# Patient Record
Sex: MALE | Race: WHITE | NOT HISPANIC OR LATINO | URBAN - METROPOLITAN AREA
[De-identification: names, ages, dates, MRNs, and addresses within clinical notes are randomized per-mention and may not be internally consistent; named-entity substitution may affect disease eponyms.]

---

## 2022-09-29 ENCOUNTER — EMERGENCY (EMERGENCY)
Facility: HOSPITAL | Age: 57
LOS: 0 days | Discharge: HOME | End: 2022-09-29
Attending: STUDENT IN AN ORGANIZED HEALTH CARE EDUCATION/TRAINING PROGRAM

## 2022-09-29 VITALS
TEMPERATURE: 98 F | WEIGHT: 279.99 LBS | DIASTOLIC BLOOD PRESSURE: 86 MMHG | OXYGEN SATURATION: 99 % | RESPIRATION RATE: 20 BRPM | SYSTOLIC BLOOD PRESSURE: 145 MMHG | HEART RATE: 71 BPM

## 2022-09-29 VITALS — SYSTOLIC BLOOD PRESSURE: 163 MMHG | HEART RATE: 85 BPM | DIASTOLIC BLOOD PRESSURE: 80 MMHG

## 2022-09-29 DIAGNOSIS — M54.6 PAIN IN THORACIC SPINE: ICD-10-CM

## 2022-09-29 DIAGNOSIS — S01.81XA LACERATION WITHOUT FOREIGN BODY OF OTHER PART OF HEAD, INITIAL ENCOUNTER: ICD-10-CM

## 2022-09-29 DIAGNOSIS — S52.592A OTHER FRACTURES OF LOWER END OF LEFT RADIUS, INITIAL ENCOUNTER FOR CLOSED FRACTURE: ICD-10-CM

## 2022-09-29 DIAGNOSIS — Z88.0 ALLERGY STATUS TO PENICILLIN: ICD-10-CM
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
## 2022-09-29 DIAGNOSIS — S22.020A WEDGE COMPRESSION FRACTURE OF SECOND THORACIC VERTEBRA, INITIAL ENCOUNTER FOR CLOSED FRACTURE: ICD-10-CM
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
## 2022-09-29 DIAGNOSIS — S22.010A WEDGE COMPRESSION FRACTURE OF FIRST THORACIC VERTEBRA, INITIAL ENCOUNTER FOR CLOSED FRACTURE: ICD-10-CM

## 2022-09-29 DIAGNOSIS — M25.532 PAIN IN LEFT WRIST: ICD-10-CM

## 2022-09-29 DIAGNOSIS — Z20.822 CONTACT WITH AND (SUSPECTED) EXPOSURE TO COVID-19: ICD-10-CM

## 2022-09-29 LAB
ALBUMIN SERPL ELPH-MCNC: 3.7 G/DL — SIGNIFICANT CHANGE UP (ref 3.3–5)
ALP SERPL-CCNC: 60 U/L — SIGNIFICANT CHANGE UP (ref 40–120)
ALT FLD-CCNC: 29 U/L — SIGNIFICANT CHANGE UP (ref 12–78)
ANION GAP SERPL CALC-SCNC: 6 MMOL/L — SIGNIFICANT CHANGE UP (ref 5–17)
APTT BLD: 27.9 SEC — SIGNIFICANT CHANGE UP (ref 27.5–35.5)
AST SERPL-CCNC: 31 U/L — SIGNIFICANT CHANGE UP (ref 15–37)
BASOPHILS # BLD AUTO: 0.03 K/UL — SIGNIFICANT CHANGE UP (ref 0–0.2)
BASOPHILS NFR BLD AUTO: 0.5 % — SIGNIFICANT CHANGE UP (ref 0–2)
BILIRUB SERPL-MCNC: 0.8 MG/DL — SIGNIFICANT CHANGE UP (ref 0.2–1.2)
BLD GP AB SCN SERPL QL: SIGNIFICANT CHANGE UP
BUN SERPL-MCNC: 16 MG/DL — SIGNIFICANT CHANGE UP (ref 7–23)
CALCIUM SERPL-MCNC: 9.3 MG/DL — SIGNIFICANT CHANGE UP (ref 8.5–10.1)
CHLORIDE SERPL-SCNC: 113 MMOL/L — HIGH (ref 96–108)
CO2 SERPL-SCNC: 24 MMOL/L — SIGNIFICANT CHANGE UP (ref 22–31)
CREAT SERPL-MCNC: 1.12 MG/DL — SIGNIFICANT CHANGE UP (ref 0.5–1.3)
EGFR: 77 ML/MIN/1.73M2 — SIGNIFICANT CHANGE UP
EOSINOPHIL # BLD AUTO: 0.12 K/UL — SIGNIFICANT CHANGE UP (ref 0–0.5)
EOSINOPHIL NFR BLD AUTO: 1.9 % — SIGNIFICANT CHANGE UP (ref 0–6)
FLUAV AG NPH QL: SIGNIFICANT CHANGE UP
FLUBV AG NPH QL: SIGNIFICANT CHANGE UP
GLUCOSE BLDC GLUCOMTR-MCNC: 128 MG/DL — HIGH (ref 70–99)
GLUCOSE SERPL-MCNC: 152 MG/DL — HIGH (ref 70–99)
HCT VFR BLD CALC: 45.2 % — SIGNIFICANT CHANGE UP (ref 39–50)
HGB BLD-MCNC: 14.6 G/DL — SIGNIFICANT CHANGE UP (ref 13–17)
IMM GRANULOCYTES NFR BLD AUTO: 0.8 % — SIGNIFICANT CHANGE UP (ref 0–0.9)
INR BLD: 1.04 RATIO — SIGNIFICANT CHANGE UP (ref 0.88–1.16)
LIDOCAIN IGE QN: 196 U/L — SIGNIFICANT CHANGE UP (ref 73–393)
LYMPHOCYTES # BLD AUTO: 1.09 K/UL — SIGNIFICANT CHANGE UP (ref 1–3.3)
LYMPHOCYTES # BLD AUTO: 17 % — SIGNIFICANT CHANGE UP (ref 13–44)
MCHC RBC-ENTMCNC: 26.7 PG — LOW (ref 27–34)
MCHC RBC-ENTMCNC: 32.3 G/DL — SIGNIFICANT CHANGE UP (ref 32–36)
MCV RBC AUTO: 82.8 FL — SIGNIFICANT CHANGE UP (ref 80–100)
MONOCYTES # BLD AUTO: 0.41 K/UL — SIGNIFICANT CHANGE UP (ref 0–0.9)
MONOCYTES NFR BLD AUTO: 6.4 % — SIGNIFICANT CHANGE UP (ref 2–14)
NEUTROPHILS # BLD AUTO: 4.7 K/UL — SIGNIFICANT CHANGE UP (ref 1.8–7.4)
NEUTROPHILS NFR BLD AUTO: 73.4 % — SIGNIFICANT CHANGE UP (ref 43–77)
NRBC # BLD: 0 /100 WBCS — SIGNIFICANT CHANGE UP (ref 0–0)
PLATELET # BLD AUTO: 244 K/UL — SIGNIFICANT CHANGE UP (ref 150–400)
POTASSIUM SERPL-MCNC: 4.4 MMOL/L — SIGNIFICANT CHANGE UP (ref 3.5–5.3)
POTASSIUM SERPL-SCNC: 4.4 MMOL/L — SIGNIFICANT CHANGE UP (ref 3.5–5.3)
PROT SERPL-MCNC: 6.8 GM/DL — SIGNIFICANT CHANGE UP (ref 6–8.3)
PROTHROM AB SERPL-ACNC: 12.5 SEC — SIGNIFICANT CHANGE UP (ref 10.5–13.4)
RBC # BLD: 5.46 M/UL — SIGNIFICANT CHANGE UP (ref 4.2–5.8)
RBC # FLD: 14.1 % — SIGNIFICANT CHANGE UP (ref 10.3–14.5)
SARS-COV-2 RNA SPEC QL NAA+PROBE: SIGNIFICANT CHANGE UP
SODIUM SERPL-SCNC: 143 MMOL/L — SIGNIFICANT CHANGE UP (ref 135–145)
WBC # BLD: 6.4 K/UL — SIGNIFICANT CHANGE UP (ref 3.8–10.5)
WBC # FLD AUTO: 6.4 K/UL — SIGNIFICANT CHANGE UP (ref 3.8–10.5)

## 2022-09-29 PROCEDURE — 74177 CT ABD & PELVIS W/CONTRAST: CPT | Mod: 26,MA

## 2022-09-29 PROCEDURE — 73090 X-RAY EXAM OF FOREARM: CPT | Mod: 26,LT

## 2022-09-29 PROCEDURE — 73080 X-RAY EXAM OF ELBOW: CPT | Mod: 26,LT

## 2022-09-29 PROCEDURE — 71260 CT THORAX DX C+: CPT | Mod: 26,MA

## 2022-09-29 PROCEDURE — 73110 X-RAY EXAM OF WRIST: CPT | Mod: 26,77,LT

## 2022-09-29 PROCEDURE — 73110 X-RAY EXAM OF WRIST: CPT | Mod: 26,RT

## 2022-09-29 PROCEDURE — 99285 EMERGENCY DEPT VISIT HI MDM: CPT

## 2022-09-29 PROCEDURE — 72125 CT NECK SPINE W/O DYE: CPT | Mod: 26,MA

## 2022-09-29 PROCEDURE — 70450 CT HEAD/BRAIN W/O DYE: CPT | Mod: 26,MA

## 2022-09-29 PROCEDURE — 73130 X-RAY EXAM OF HAND: CPT | Mod: 26,LT

## 2022-09-29 RX ORDER — HYDROMORPHONE HYDROCHLORIDE 2 MG/ML
1 INJECTION INTRAMUSCULAR; INTRAVENOUS; SUBCUTANEOUS ONCE
Refills: 0 | Status: DISCONTINUED | OUTPATIENT
Start: 2022-09-29 | End: 2022-09-29

## 2022-09-29 RX ORDER — SODIUM CHLORIDE 9 MG/ML
250 INJECTION INTRAMUSCULAR; INTRAVENOUS; SUBCUTANEOUS ONCE
Refills: 0 | Status: COMPLETED | OUTPATIENT
Start: 2022-09-29 | End: 2022-09-29

## 2022-09-29 RX ADMIN — SODIUM CHLORIDE 250 MILLILITER(S): 9 INJECTION INTRAMUSCULAR; INTRAVENOUS; SUBCUTANEOUS at 09:03

## 2022-09-29 RX ADMIN — HYDROMORPHONE HYDROCHLORIDE 1 MILLIGRAM(S): 2 INJECTION INTRAMUSCULAR; INTRAVENOUS; SUBCUTANEOUS at 08:56

## 2022-09-29 RX ADMIN — HYDROMORPHONE HYDROCHLORIDE 1 MILLIGRAM(S): 2 INJECTION INTRAMUSCULAR; INTRAVENOUS; SUBCUTANEOUS at 14:11

## 2022-09-29 NOTE — ED PROVIDER NOTE - PATIENT PORTAL LINK FT
You can access the FollowMyHealth Patient Portal offered by [facility] by registering at the following website: [email or web address]. By joining [facility]’s FollowMyHealth portal, you will also be able to view your health information using other applications (apps) compatible with our system.

## 2022-09-29 NOTE — CONSULT NOTE ADULT - SUBJECTIVE AND OBJECTIVE BOX
Pt Name: INESSA CABRERA    MRN: [id number]    HPI: Patient is a 57y Male who was BIBEMS after a mechanical fall. Pt reports falling off his truck, landing on his outstretched L wrist, +HS, -LOC. Pt is RHD and an independent ambulator. Pt denies taking anticoagulation. Pt denies f/c/n/v/cp/sob. Pt has no further orthopaedic complaints at this time.    PAST MEDICAL & SURGICAL HISTORY:      Allergies: penicillin (Unknown)      Ambulation: Baseline Ambulation [ ] independent [ ] With Cane [ ] With Walker [ ]  Bedbound [ ] Pivot transfers to Wheelchair only                          14.6   6.40  )-----------( 244      ( 29 Sep 2022 08:51 )             45.2     09-29    143  |  113<H>  |  16  ----------------------------<  152<H>  4.4   |  24  |  1.12    Ca    9.3      29 Sep 2022 08:51    TPro  6.8  /  Alb  3.7  /  TBili  0.8  /  DBili  x   /  AST  31  /  ALT  29  /  AlkPhos  60  09-29    PT/INR - ( 29 Sep 2022 08:51 )   PT: 12.5 sec;   INR: 1.04 ratio         PTT - ( 29 Sep 2022 08:51 )  PTT:27.9 sec      PHYSICAL EXAM:    Vital Signs Last 24 Hrs  T(C): 36.9 (29 Sep 2022 11:21), Max: 36.9 (29 Sep 2022 11:21)  T(F): 98.4 (29 Sep 2022 11:21), Max: 98.4 (29 Sep 2022 11:21)  HR: 85 (29 Sep 2022 13:18) (71 - 86)  BP: 163/80 (29 Sep 2022 13:18) (145/86 - 203/113)  BP(mean): --  RR: 19 (29 Sep 2022 11:21) (19 - 20)  SpO2: 96% (29 Sep 2022 11:21) (96% - 99%)    Parameters below as of 29 Sep 2022 11:21  Patient On (Oxygen Delivery Method): room air        Physical Exam:  General: NAD, Alert, Awake and oriented.  Respiratory: Symmetric chest wall expansion bilaterally, no accessory muscle use    RIGHT UE: Superficial abrasions noted over dorsal aspect of R hand. No other obvious deformities or other signs of trauma at shoulder, upper arm, elbow, forearm, wrist or hand.  Full baseline painless ROM at shoulder, elbow, wrist, and . TTP over R triquetrum. No other bony TTP. SILT in axillary, radial, median, and ulnar distributions. 2+ radial pulse with brisk cap refill at distal finger tips. Compartments soft and compressible. Strength 5/5.      LEFT UE: No open skin. Dorsal angulation of wrist noted with swelling. No other obvious deformities or other signs of trauma at shoulder, upper arm, elbow, forearm, wrist or hand.  Full baseline painless ROM at shoulder, elbow. Wrist TTP diffusely, no other bony TTP. SILT in axillary, radial, median, and ulnar distributions. 2+ radial pulse with brisk cap refill at distal finger tips. Compartments soft and compressible. Strength 5/5.    HIPS and PELVIS: Pelvis stable to AP and Lat compression. Able to actively SLR bilaterally. Negative Log Roll, Negative Heel strike bilaterally. No pain on internal/external rotation of the hips.    RIGHT LE: No open skin. No deformities or other signs of trauma at hip, thigh, knee, leg, ankle or foot. Full baseline painless ROM at hip, knee, ankle and toes with negative log-roll and heel strike. Able to actively SLR. SILT toes 1-5. No bony TTP to hip, knee or ankle. 2+ DP/PT pulses with brisk cap refill distally. Compartments soft and compressible. No pain on passive stretch. Strength 5/5.      LEFT LE: No open skin. No deformities  or other signs of trauma at hip, thigh, knee, leg, ankle or foot.  Full baseline painless ROM at hip, knee, ankle and toe with negative log-roll and heel strike. Able to actively SLR. SILT toes 1-5. No bony TTP to hip, knee or ankle. 2+ DP/PT pulses with brisk cap refill distally. Compartments soft and compressible. No pain on passive stretch. Strength 5/5.        Imaging: Radiographs of L wrist reviewed which demonstrate a dorsally angulated L wrist fx, and a R triquetral avulsion fx.    Orthopedic A/P:    Pt is a  57y Male s/p closed reduction of a L distal radius fracture in a sugartong splint, and s/p R volar slab for a triquetral fx.    Procedure:    Procedure:  Under aseptic conditions, a hematoma block was administered to the fracture site using 10cc of 1% lidocaine. Closed reduction was performed and a well molded, well padded plaster splint was applied. The patient tolerated the procedure well and there we no complications. The patient's post-reduction neurvascular exam was unchanged. Post-reduction xrays demonstrated acceptable alignment.        -Pain control PRN  -NWB LUE  -Keep splint/dressing clean and dry.   -ICE and elevate  -Do not remove dressing/splint until follow up in the office.   -Plan discussed w patient who is in agreement with the above.  -Follow up with Dr. Lara within 1 week. Please call the office to make an appointment.   -Discussed with Dr. Lara who is aware and approves of plan.  Pt Name: INESSA CABRERA    MRN: [id number]    HPI: Patient is a 57y Male who was BIBEMS after a mechanical fall. Pt reports falling off his truck, landing on his outstretched L wrist, +HS, -LOC. Pt is RHD and an independent ambulator. Pt denies taking anticoagulation. Pt denies f/c/n/v/cp/sob. Pt has no further orthopaedic complaints at this time.    PAST MEDICAL & SURGICAL HISTORY:      Allergies: penicillin (Unknown)      Ambulation: Baseline Ambulation [ ] independent [ ] With Cane [ ] With Walker [ ]  Bedbound [ ] Pivot transfers to Wheelchair only                          14.6   6.40  )-----------( 244      ( 29 Sep 2022 08:51 )             45.2     09-29    143  |  113<H>  |  16  ----------------------------<  152<H>  4.4   |  24  |  1.12    Ca    9.3      29 Sep 2022 08:51    TPro  6.8  /  Alb  3.7  /  TBili  0.8  /  DBili  x   /  AST  31  /  ALT  29  /  AlkPhos  60  09-29    PT/INR - ( 29 Sep 2022 08:51 )   PT: 12.5 sec;   INR: 1.04 ratio         PTT - ( 29 Sep 2022 08:51 )  PTT:27.9 sec      PHYSICAL EXAM:    Vital Signs Last 24 Hrs  T(C): 36.9 (29 Sep 2022 11:21), Max: 36.9 (29 Sep 2022 11:21)  T(F): 98.4 (29 Sep 2022 11:21), Max: 98.4 (29 Sep 2022 11:21)  HR: 85 (29 Sep 2022 13:18) (71 - 86)  BP: 163/80 (29 Sep 2022 13:18) (145/86 - 203/113)  BP(mean): --  RR: 19 (29 Sep 2022 11:21) (19 - 20)  SpO2: 96% (29 Sep 2022 11:21) (96% - 99%)    Parameters below as of 29 Sep 2022 11:21  Patient On (Oxygen Delivery Method): room air        Physical Exam:  General: NAD, Alert, Awake and oriented.  Respiratory: Symmetric chest wall expansion bilaterally, no accessory muscle use    RIGHT UE: Superficial abrasions noted over dorsal aspect of R hand. No other obvious deformities or other signs of trauma at shoulder, upper arm, elbow, forearm, wrist or hand.  Full baseline painless ROM at shoulder, elbow, wrist, and . TTP over R triquetrum. No other bony TTP. SILT in axillary, radial, median, and ulnar distributions. 2+ radial pulse with brisk cap refill at distal finger tips. Compartments soft and compressible. Strength 5/5.      LEFT UE: No open skin. Dorsal angulation of wrist noted with swelling. No other obvious deformities or other signs of trauma at shoulder, upper arm, elbow, forearm, wrist or hand.  Full baseline painless ROM at shoulder, elbow. Wrist TTP diffusely, no other bony TTP. SILT in axillary, radial, median, and ulnar distributions. 2+ radial pulse with brisk cap refill at distal finger tips. Compartments soft and compressible. Strength 5/5.    HIPS and PELVIS: Pelvis stable to AP and Lat compression. Able to actively SLR bilaterally. Negative Log Roll, Negative Heel strike bilaterally. No pain on internal/external rotation of the hips.    RIGHT LE: No open skin. No deformities or other signs of trauma at hip, thigh, knee, leg, ankle or foot. Full baseline painless ROM at hip, knee, ankle and toes with negative log-roll and heel strike. Able to actively SLR. SILT toes 1-5. No bony TTP to hip, knee or ankle. 2+ DP/PT pulses with brisk cap refill distally. Compartments soft and compressible. No pain on passive stretch. Strength 5/5.      LEFT LE: No open skin. No deformities  or other signs of trauma at hip, thigh, knee, leg, ankle or foot.  Full baseline painless ROM at hip, knee, ankle and toe with negative log-roll and heel strike. Able to actively SLR. SILT toes 1-5. No bony TTP to hip, knee or ankle. 2+ DP/PT pulses with brisk cap refill distally. Compartments soft and compressible. No pain on passive stretch. Strength 5/5.        Imaging: Radiographs of B/L wrists reviewed which demonstrate a dorsally angulated L wrist fx, and a R triquetral avulsion fx.    Orthopedic A/P:    Pt is a  57y Male s/p closed reduction of a L distal radius fracture in a sugartong splint, and s/p R volar slab for a triquetral fx.    Procedure:    Procedure:  Under aseptic conditions, a hematoma block was administered to the fracture site using 10cc of 1% lidocaine. Closed reduction was performed and a well molded, well padded plaster splint was applied. The patient tolerated the procedure well and there we no complications. The patient's post-reduction neurvascular exam was unchanged. Post-reduction xrays demonstrated acceptable alignment.        -Pain control PRN  - NWB LUE in STS  - NWB RUE in volar slab  - Keep splint/dressing clean and dry.   - ICE and elevate  - Do not remove dressing/splint until follow up in the office.   - Plan discussed w patient who is in agreement with the above.  - Follow up with Dr. Lara within 1 week. Please call the office to make an appointment.   - Discussed with Dr. Lara who is aware and approves of plan.

## 2022-09-29 NOTE — ED ADULT TRIAGE NOTE - CHIEF COMPLAINT QUOTE
BIBA as per patient c/o possible L wrist fracture, splint placed by EMS, posterior head lac, bleeding controlled, and upper back pain s/t mechanical fall off a truck.   Hx: Intercranial HTN.  5mg morphine given in route.

## 2022-09-29 NOTE — ED PROVIDER NOTE - OBJECTIVE STATEMENT
57-year-old male with history of IIH presenting to the ED status post mechanical slip and fall off back of flatbed truck fell backwards approximately 3 feet off the ground hitting head, no LOC, no anticoagulation complaining of left wrist pain 10 out of 10 pain worse with range of motion, head laceration, upper mid back pain worse with range of motion.  No numbness weakness, also admits to mild right wrist pain with preserved range of motion.  No other complaints.

## 2022-09-29 NOTE — ED PROVIDER NOTE - NS ED ROS FT
Constitutional: See HPI.  Eyes: No visual changes, eye pain or discharge. No Photophobia  ENMT: No hearing changes, pain, discharge or infections. No neck pain or stiffness. No limited ROM  Cardiac: No SOB or edema. No chest pain with exertion.  Respiratory: No cough or respiratory distress.  GI: No nausea, vomiting, diarrhea or abdominal pain.  : No dysuria, frequency or burning. No Discharge  MS: see hpi   Neuro: see hpi   Skin: see hpi   Except as documented in the HPI, all other systems are negative.

## 2022-09-29 NOTE — ED PROVIDER NOTE - CLINICAL SUMMARY MEDICAL DECISION MAKING FREE TEXT BOX
57 male with no anticoagulation presenting status post mechanical slip and fall, pan scan family x-rays pain control labs orthopedic consult for obvious deformity of left wrist, follow-up imaging and reassess

## 2022-09-29 NOTE — ED PROVIDER NOTE - PROVIDER TOKENS
PROVIDER:[TOKEN:[[id number]],FOLLOWUP:[7-10 Days]],PROVIDER:[TOKEN:[[id number]],FOLLOWUP:[4-6 Days]]

## 2022-09-29 NOTE — ED PROVIDER NOTE - CARE PROVIDER_API CALL
Rochelle Henley (DO)  Orthopaedic Surgery Surgery  [address] [facility] [facility] [address]  [place] [address]  Phone: [phone]  Fax: [phone]  Follow Up Time: 7-10 Days    Winston Lara (DO)  Orthopaedic Surgery  [address] [place] [address]  Phone: [phone]  Fax: [phone]  Follow Up Time: 4-6 Days

## 2022-09-29 NOTE — ED PROVIDER NOTE - PHYSICAL EXAMINATION
VITAL SIGNS: I have reviewed nursing notes and confirm.  CONSTITUTIONAL: well-appearing, non-toxic, NAD  SKIN: Warm dry, normal skin turgor  HEAD: NC   EYES: EOMI, PERRLA, no scleral icterus  ENT: Moist mucous membranes, normal pharynx with no erythema or exudates  NECK: Supple; non tender. Full ROM.   CARD: RRR, no murmurs, rubs or gallops  RESP: clear to ausculation b/l.  No rales, rhonchi, or wheezing.  ABD: soft, + BS, non-tender, non-distended, no rebound or guarding. No CVA tenderness  EXT: left wrist deformity, neurovascularly intact, mild wrist ttp   NEURO: normal motor. normal sensory. CN II-XII intact. Cerebellar testing normal. Normal gait.  PSYCH: Cooperative, appropriate.
